# Patient Record
Sex: FEMALE | Race: WHITE
[De-identification: names, ages, dates, MRNs, and addresses within clinical notes are randomized per-mention and may not be internally consistent; named-entity substitution may affect disease eponyms.]

---

## 2017-10-19 ENCOUNTER — HOSPITAL ENCOUNTER (OUTPATIENT)
Dept: HOSPITAL 84 - D.CT | Age: 67
Discharge: HOME | End: 2017-10-19
Attending: FAMILY MEDICINE
Payer: MEDICARE
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
## 2017-10-19 DIAGNOSIS — S32.401A: Primary | ICD-10-CM

## 2017-12-31 ENCOUNTER — HOSPITAL ENCOUNTER (EMERGENCY)
Dept: HOSPITAL 84 - D.ER | Age: 67
Discharge: ANOTHER HOSPITAL | End: 2017-12-31
Payer: MEDICARE

## 2017-12-31 DIAGNOSIS — I10: ICD-10-CM

## 2017-12-31 DIAGNOSIS — R41.82: Primary | ICD-10-CM

## 2017-12-31 LAB
ALBUMIN SERPL-MCNC: 3.9 G/DL (ref 3.4–5)
ALP SERPL-CCNC: 88 U/L (ref 46–116)
ALT SERPL-CCNC: 35 U/L (ref 10–68)
AMPHETAMINES UR QL SCN: NEGATIVE QUAL
ANION GAP SERPL CALC-SCNC: 12.3 MMOL/L (ref 8–16)
APPEARANCE UR: CLEAR
BARBITURATES UR QL SCN: NEGATIVE QUAL
BASOPHILS NFR BLD AUTO: 0.1 % (ref 0–2)
BENZODIAZ UR QL SCN: NEGATIVE QUAL
BILIRUB SERPL-MCNC: 0.31 MG/DL (ref 0.2–1.3)
BILIRUB SERPL-MCNC: NEGATIVE MG/DL
BUN SERPL-MCNC: 18 MG/DL (ref 7–18)
BZE UR QL SCN: NEGATIVE QUAL
CALCIUM SERPL-MCNC: 8.7 MG/DL (ref 8.5–10.1)
CANNABINOIDS UR QL SCN: NEGATIVE QUAL
CHLORIDE SERPL-SCNC: 104 MMOL/L (ref 98–107)
CO2 SERPL-SCNC: 26.2 MMOL/L (ref 21–32)
COLOR UR: YELLOW
CREAT SERPL-MCNC: 0.8 MG/DL (ref 0.6–1.3)
EOSINOPHIL NFR BLD: 1 % (ref 0–7)
ERYTHROCYTE [DISTWIDTH] IN BLOOD BY AUTOMATED COUNT: 12.8 % (ref 11.5–14.5)
ETHANOL SERPL-MCNC: 0 MG/DL (ref 0–10)
GLOBULIN SER-MCNC: 3.4 G/L
GLUCOSE SERPL-MCNC: 137 MG/DL (ref 74–106)
GLUCOSE SERPL-MCNC: NEGATIVE MG/DL
HCT VFR BLD CALC: 41.9 % (ref 36–48)
HGB BLD-MCNC: 14.4 G/DL (ref 12–16)
IMM GRANULOCYTES NFR BLD: 0.1 % (ref 0–5)
KETONES UR STRIP-MCNC: NEGATIVE MG/DL
LYMPHOCYTES NFR BLD AUTO: 19.2 % (ref 15–50)
MCH RBC QN AUTO: 30.3 PG (ref 26–34)
MCHC RBC AUTO-ENTMCNC: 34.4 G/DL (ref 31–37)
MCV RBC: 88.2 FL (ref 80–100)
MONOCYTES NFR BLD: 7.6 % (ref 2–11)
NEUTROPHILS NFR BLD AUTO: 72 % (ref 40–80)
NITRITE UR-MCNC: NEGATIVE MG/ML
OPIATES UR QL SCN: NEGATIVE QUAL
OSMOLALITY SERPL CALC.SUM OF ELEC: 281 MOSM/KG (ref 275–300)
PCP UR QL SCN: NEGATIVE QUAL
PH UR STRIP: 7 [PH] (ref 5–6)
PLATELET # BLD: 215 10X3/UL (ref 130–400)
PMV BLD AUTO: 9.3 FL (ref 7.4–10.4)
POTASSIUM SERPL-SCNC: 3.5 MMOL/L (ref 3.5–5.1)
PROT SERPL-MCNC: 7.3 G/DL (ref 6.4–8.2)
PROT UR-MCNC: NEGATIVE MG/DL
RBC # BLD AUTO: 4.75 10X6/UL (ref 4–5.4)
SODIUM SERPL-SCNC: 139 MMOL/L (ref 136–145)
SP GR UR STRIP: 1.01 (ref 1–1.02)
UROBILINOGEN UR-MCNC: NORMAL MG/DL
WBC # BLD AUTO: 7 10X3/UL (ref 4.8–10.8)

## 2018-06-10 ENCOUNTER — HOSPITAL ENCOUNTER (INPATIENT)
Dept: HOSPITAL 84 - D.ER | Age: 68
LOS: 4 days | Discharge: HOME | DRG: 853 | End: 2018-06-14
Attending: INTERNAL MEDICINE | Admitting: INTERNAL MEDICINE
Payer: MEDICARE

## 2018-06-10 VITALS
WEIGHT: 174.37 LBS | HEIGHT: 67 IN | HEIGHT: 67 IN | BODY MASS INDEX: 27.37 KG/M2 | BODY MASS INDEX: 27.37 KG/M2 | WEIGHT: 174.37 LBS | BODY MASS INDEX: 27.37 KG/M2 | BODY MASS INDEX: 27.37 KG/M2

## 2018-06-10 VITALS — SYSTOLIC BLOOD PRESSURE: 140 MMHG | DIASTOLIC BLOOD PRESSURE: 76 MMHG

## 2018-06-10 VITALS — SYSTOLIC BLOOD PRESSURE: 158 MMHG | DIASTOLIC BLOOD PRESSURE: 97 MMHG

## 2018-06-10 VITALS — DIASTOLIC BLOOD PRESSURE: 92 MMHG | SYSTOLIC BLOOD PRESSURE: 140 MMHG

## 2018-06-10 VITALS — SYSTOLIC BLOOD PRESSURE: 180 MMHG | DIASTOLIC BLOOD PRESSURE: 99 MMHG

## 2018-06-10 VITALS — DIASTOLIC BLOOD PRESSURE: 97 MMHG | SYSTOLIC BLOOD PRESSURE: 186 MMHG

## 2018-06-10 VITALS — SYSTOLIC BLOOD PRESSURE: 152 MMHG | DIASTOLIC BLOOD PRESSURE: 94 MMHG

## 2018-06-10 DIAGNOSIS — G93.40: ICD-10-CM

## 2018-06-10 DIAGNOSIS — I10: ICD-10-CM

## 2018-06-10 DIAGNOSIS — K81.0: ICD-10-CM

## 2018-06-10 DIAGNOSIS — F05: ICD-10-CM

## 2018-06-10 DIAGNOSIS — N39.0: ICD-10-CM

## 2018-06-10 DIAGNOSIS — A41.9: Primary | ICD-10-CM

## 2018-06-10 LAB
ALBUMIN SERPL-MCNC: 3.6 G/DL (ref 3.4–5)
ALP SERPL-CCNC: 132 U/L (ref 46–116)
ALT SERPL-CCNC: 25 U/L (ref 10–68)
AMPHETAMINES UR QL SCN: NEGATIVE QUAL
AMYLASE SERPL-CCNC: 26 U/L (ref 25–115)
ANION GAP SERPL CALC-SCNC: 13.8 MMOL/L (ref 8–16)
APPEARANCE UR: (no result)
APTT BLD: 27.3 SECONDS (ref 22.8–39.4)
BACTERIA #/AREA URNS HPF: (no result) /HPF
BARBITURATES UR QL SCN: NEGATIVE QUAL
BASOPHILS NFR BLD AUTO: 0.1 % (ref 0–2)
BENZODIAZ UR QL SCN: NEGATIVE QUAL
BILIRUB DIRECT SERPL-MCNC: 0.13 MG/DL (ref 0–0.3)
BILIRUB SERPL-MCNC: 0.52 MG/DL (ref 0.2–1.3)
BILIRUB SERPL-MCNC: NEGATIVE MG/DL
BUN SERPL-MCNC: 12 MG/DL (ref 7–18)
BZE UR QL SCN: NEGATIVE QUAL
CALCIUM SERPL-MCNC: 8.9 MG/DL (ref 8.5–10.1)
CANNABINOIDS UR QL SCN: NEGATIVE QUAL
CHLORIDE SERPL-SCNC: 97 MMOL/L (ref 98–107)
CHOLEST/HDLC SERPL: 2.9 RATIO (ref 2.3–4.1)
CK MB SERPL-MCNC: 1.3 U/L (ref 0–3.6)
CK SERPL-CCNC: 91 UL (ref 21–215)
CO2 SERPL-SCNC: 28.9 MMOL/L (ref 21–32)
COLOR UR: (no result)
CREAT SERPL-MCNC: 0.9 MG/DL (ref 0.6–1.3)
EOSINOPHIL NFR BLD: 0 % (ref 0–7)
ERYTHROCYTE [DISTWIDTH] IN BLOOD BY AUTOMATED COUNT: 12.9 % (ref 11.5–14.5)
GLOBULIN SER-MCNC: 4.7 G/L
GLUCOSE SERPL-MCNC: 148 MG/DL (ref 74–106)
GLUCOSE SERPL-MCNC: NEGATIVE MG/DL
GRAN CASTS #/AREA URNS LPF: (no result) /LPF
HCT VFR BLD CALC: 42.3 % (ref 36–48)
HDLC SERPL-MCNC: 67 MG/DL (ref 32–96)
HGB BLD-MCNC: 14.6 G/DL (ref 12–16)
HYALINE CASTS #/AREA URNS LPF: (no result) /LPF
IMM GRANULOCYTES NFR BLD: 0.3 % (ref 0–5)
INR PPP: 1.02 (ref 0.85–1.17)
KETONES UR STRIP-MCNC: (no result) MG/DL
LDL-HDL RATIO: 1.8 RATIO (ref 1.5–3.5)
LDLC SERPL-MCNC: 122 MG/DL (ref 0–100)
LIPASE SERPL-CCNC: 90 U/L (ref 73–393)
LYMPHOCYTES NFR BLD AUTO: 6.4 % (ref 15–50)
MAGNESIUM SERPL-MCNC: 1.9 MG/DL (ref 1.8–2.4)
MCH RBC QN AUTO: 28.9 PG (ref 26–34)
MCHC RBC AUTO-ENTMCNC: 34.5 G/DL (ref 31–37)
MCV RBC: 83.6 FL (ref 80–100)
MONOCYTES NFR BLD: 8.6 % (ref 2–11)
MUCOUS THREADS #/AREA URNS LPF: (no result) /LPF
NEUTROPHILS NFR BLD AUTO: 84.6 % (ref 40–80)
NITRITE UR-MCNC: NEGATIVE MG/ML
OPIATES UR QL SCN: NEGATIVE QUAL
OSMOLALITY SERPL CALC.SUM OF ELEC: 274 MOSM/KG (ref 275–300)
PCP UR QL SCN: NEGATIVE QUAL
PH UR STRIP: 6 [PH] (ref 5–6)
PLATELET # BLD: 244 10X3/UL (ref 130–400)
PMV BLD AUTO: 9 FL (ref 7.4–10.4)
POTASSIUM SERPL-SCNC: 3.7 MMOL/L (ref 3.5–5.1)
PROT SERPL-MCNC: 8.3 G/DL (ref 6.4–8.2)
PROT UR-MCNC: (no result) MG/DL
PROTHROMBIN TIME: 13 SECONDS (ref 11.6–15)
RBC # BLD AUTO: 5.06 10X6/UL (ref 4–5.4)
RBC #/AREA URNS HPF: (no result) /HPF (ref 0–5)
SODIUM SERPL-SCNC: 136 MMOL/L (ref 136–145)
SP GR UR STRIP: 1.02 (ref 1–1.02)
SQUAMOUS #/AREA URNS HPF: (no result) /HPF (ref 0–5)
TRIGL SERPL-MCNC: 40 MG/DL (ref 30–200)
TROPONIN I SERPL-MCNC: < 0.017 NG/ML (ref 0–0.06)
TSH SERPL-ACNC: 0.97 UIU/ML (ref 0.36–3.74)
UROBILINOGEN UR-MCNC: NORMAL MG/DL
WBC # BLD AUTO: 13.5 10X3/UL (ref 4.8–10.8)
WBC #/AREA URNS HPF: (no result) /HPF (ref 0–5)

## 2018-06-11 VITALS — SYSTOLIC BLOOD PRESSURE: 89 MMHG | DIASTOLIC BLOOD PRESSURE: 58 MMHG

## 2018-06-11 VITALS — DIASTOLIC BLOOD PRESSURE: 73 MMHG | SYSTOLIC BLOOD PRESSURE: 127 MMHG

## 2018-06-11 VITALS — DIASTOLIC BLOOD PRESSURE: 56 MMHG | SYSTOLIC BLOOD PRESSURE: 85 MMHG

## 2018-06-11 LAB
ALBUMIN SERPL-MCNC: 2.8 G/DL (ref 3.4–5)
ALP SERPL-CCNC: 95 U/L (ref 46–116)
ALT SERPL-CCNC: 25 U/L (ref 10–68)
ANION GAP SERPL CALC-SCNC: 13.2 MMOL/L (ref 8–16)
BASOPHILS NFR BLD AUTO: 0.1 % (ref 0–2)
BILIRUB SERPL-MCNC: 0.76 MG/DL (ref 0.2–1.3)
BUN SERPL-MCNC: 15 MG/DL (ref 7–18)
CALCIUM SERPL-MCNC: 8.7 MG/DL (ref 8.5–10.1)
CHLORIDE SERPL-SCNC: 101 MMOL/L (ref 98–107)
CO2 SERPL-SCNC: 26.5 MMOL/L (ref 21–32)
CREAT SERPL-MCNC: 1 MG/DL (ref 0.6–1.3)
EOSINOPHIL NFR BLD: 0 % (ref 0–7)
ERYTHROCYTE [DISTWIDTH] IN BLOOD BY AUTOMATED COUNT: 13.2 % (ref 11.5–14.5)
GLOBULIN SER-MCNC: 4.3 G/L
GLUCOSE SERPL-MCNC: 140 MG/DL (ref 74–106)
HCT VFR BLD CALC: 40.1 % (ref 36–48)
HGB BLD-MCNC: 13.8 G/DL (ref 12–16)
IMM GRANULOCYTES NFR BLD: 0.3 % (ref 0–5)
LYMPHOCYTES NFR BLD AUTO: 6.1 % (ref 15–50)
MCH RBC QN AUTO: 28.9 PG (ref 26–34)
MCHC RBC AUTO-ENTMCNC: 34.4 G/DL (ref 31–37)
MCV RBC: 83.9 FL (ref 80–100)
MONOCYTES NFR BLD: 11.1 % (ref 2–11)
NEUTROPHILS NFR BLD AUTO: 82.4 % (ref 40–80)
OSMOLALITY SERPL CALC.SUM OF ELEC: 276 MOSM/KG (ref 275–300)
PLATELET # BLD: 262 10X3/UL (ref 130–400)
PMV BLD AUTO: 9.5 FL (ref 7.4–10.4)
POTASSIUM SERPL-SCNC: 3.7 MMOL/L (ref 3.5–5.1)
PROT SERPL-MCNC: 7.1 G/DL (ref 6.4–8.2)
RBC # BLD AUTO: 4.78 10X6/UL (ref 4–5.4)
SODIUM SERPL-SCNC: 137 MMOL/L (ref 136–145)
WBC # BLD AUTO: 16.1 10X3/UL (ref 4.8–10.8)

## 2018-06-12 VITALS — DIASTOLIC BLOOD PRESSURE: 54 MMHG | SYSTOLIC BLOOD PRESSURE: 84 MMHG

## 2018-06-12 VITALS — SYSTOLIC BLOOD PRESSURE: 119 MMHG | DIASTOLIC BLOOD PRESSURE: 74 MMHG

## 2018-06-12 VITALS — DIASTOLIC BLOOD PRESSURE: 63 MMHG | SYSTOLIC BLOOD PRESSURE: 165 MMHG

## 2018-06-12 VITALS — DIASTOLIC BLOOD PRESSURE: 75 MMHG | SYSTOLIC BLOOD PRESSURE: 169 MMHG

## 2018-06-12 VITALS — SYSTOLIC BLOOD PRESSURE: 101 MMHG | DIASTOLIC BLOOD PRESSURE: 59 MMHG

## 2018-06-12 VITALS — SYSTOLIC BLOOD PRESSURE: 143 MMHG | DIASTOLIC BLOOD PRESSURE: 89 MMHG

## 2018-06-12 LAB
ALBUMIN SERPL-MCNC: 2.2 G/DL (ref 3.4–5)
ALP SERPL-CCNC: 84 U/L (ref 46–116)
ALT SERPL-CCNC: 24 U/L (ref 10–68)
ANION GAP SERPL CALC-SCNC: 15.4 MMOL/L (ref 8–16)
BASOPHILS NFR BLD AUTO: 0.1 % (ref 0–2)
BILIRUB SERPL-MCNC: 0.7 MG/DL (ref 0.2–1.3)
BUN SERPL-MCNC: 21 MG/DL (ref 7–18)
CALCIUM SERPL-MCNC: 8 MG/DL (ref 8.5–10.1)
CHLORIDE SERPL-SCNC: 103 MMOL/L (ref 98–107)
CO2 SERPL-SCNC: 24.2 MMOL/L (ref 21–32)
CREAT SERPL-MCNC: 1.2 MG/DL (ref 0.6–1.3)
EOSINOPHIL NFR BLD: 0 % (ref 0–7)
ERYTHROCYTE [DISTWIDTH] IN BLOOD BY AUTOMATED COUNT: 13.4 % (ref 11.5–14.5)
GLOBULIN SER-MCNC: 3.8 G/L
GLUCOSE SERPL-MCNC: 101 MG/DL (ref 74–106)
HCT VFR BLD CALC: 35.6 % (ref 36–48)
HGB BLD-MCNC: 12 G/DL (ref 12–16)
IMM GRANULOCYTES NFR BLD: 0.5 % (ref 0–5)
LYMPHOCYTES NFR BLD AUTO: 9 % (ref 15–50)
MCH RBC QN AUTO: 28.6 PG (ref 26–34)
MCHC RBC AUTO-ENTMCNC: 33.7 G/DL (ref 31–37)
MCV RBC: 84.8 FL (ref 80–100)
MONOCYTES NFR BLD: 9.8 % (ref 2–11)
NEUTROPHILS NFR BLD AUTO: 80.6 % (ref 40–80)
OSMOLALITY SERPL CALC.SUM OF ELEC: 280 MOSM/KG (ref 275–300)
PLATELET # BLD: 220 10X3/UL (ref 130–400)
PMV BLD AUTO: 9.5 FL (ref 7.4–10.4)
POTASSIUM SERPL-SCNC: 3.6 MMOL/L (ref 3.5–5.1)
PROT SERPL-MCNC: 6 G/DL (ref 6.4–8.2)
RBC # BLD AUTO: 4.2 10X6/UL (ref 4–5.4)
SODIUM SERPL-SCNC: 139 MMOL/L (ref 136–145)
WBC # BLD AUTO: 15.5 10X3/UL (ref 4.8–10.8)

## 2018-06-12 PROCEDURE — 0FT44ZZ RESECTION OF GALLBLADDER, PERCUTANEOUS ENDOSCOPIC APPROACH: ICD-10-PCS | Performed by: SURGERY

## 2018-06-13 VITALS — SYSTOLIC BLOOD PRESSURE: 132 MMHG | DIASTOLIC BLOOD PRESSURE: 91 MMHG

## 2018-06-13 VITALS — SYSTOLIC BLOOD PRESSURE: 124 MMHG | DIASTOLIC BLOOD PRESSURE: 81 MMHG

## 2018-06-13 VITALS — DIASTOLIC BLOOD PRESSURE: 89 MMHG | SYSTOLIC BLOOD PRESSURE: 132 MMHG

## 2018-06-13 VITALS — SYSTOLIC BLOOD PRESSURE: 150 MMHG | DIASTOLIC BLOOD PRESSURE: 86 MMHG

## 2018-06-13 VITALS — DIASTOLIC BLOOD PRESSURE: 89 MMHG | SYSTOLIC BLOOD PRESSURE: 143 MMHG

## 2018-06-13 VITALS — SYSTOLIC BLOOD PRESSURE: 137 MMHG | DIASTOLIC BLOOD PRESSURE: 93 MMHG

## 2018-06-13 LAB
ALBUMIN SERPL-MCNC: 2 G/DL (ref 3.4–5)
ALP SERPL-CCNC: 128 U/L (ref 46–116)
ALT SERPL-CCNC: 118 U/L (ref 10–68)
ANION GAP SERPL CALC-SCNC: 15.9 MMOL/L (ref 8–16)
BASOPHILS NFR BLD AUTO: 0 % (ref 0–2)
BILIRUB SERPL-MCNC: 0.5 MG/DL (ref 0.2–1.3)
BUN SERPL-MCNC: 17 MG/DL (ref 7–18)
CALCIUM SERPL-MCNC: 8.5 MG/DL (ref 8.5–10.1)
CHLORIDE SERPL-SCNC: 103 MMOL/L (ref 98–107)
CO2 SERPL-SCNC: 24.1 MMOL/L (ref 21–32)
CREAT SERPL-MCNC: 0.9 MG/DL (ref 0.6–1.3)
EOSINOPHIL NFR BLD: 0 % (ref 0–7)
ERYTHROCYTE [DISTWIDTH] IN BLOOD BY AUTOMATED COUNT: 13.5 % (ref 11.5–14.5)
GLOBULIN SER-MCNC: 4.2 G/L
GLUCOSE SERPL-MCNC: 184 MG/DL (ref 74–106)
HCT VFR BLD CALC: 37.3 % (ref 36–48)
HGB BLD-MCNC: 12.6 G/DL (ref 12–16)
IMM GRANULOCYTES NFR BLD: 0.4 % (ref 0–5)
LYMPHOCYTES NFR BLD AUTO: 3.8 % (ref 15–50)
MCH RBC QN AUTO: 28.6 PG (ref 26–34)
MCHC RBC AUTO-ENTMCNC: 33.8 G/DL (ref 31–37)
MCV RBC: 84.8 FL (ref 80–100)
MONOCYTES NFR BLD: 5.9 % (ref 2–11)
NEUTROPHILS NFR BLD AUTO: 89.9 % (ref 40–80)
OSMOLALITY SERPL CALC.SUM OF ELEC: 284 MOSM/KG (ref 275–300)
PLATELET # BLD: 256 10X3/UL (ref 130–400)
PMV BLD AUTO: 9.4 FL (ref 7.4–10.4)
POTASSIUM SERPL-SCNC: 4 MMOL/L (ref 3.5–5.1)
PROT SERPL-MCNC: 6.2 G/DL (ref 6.4–8.2)
RBC # BLD AUTO: 4.4 10X6/UL (ref 4–5.4)
SODIUM SERPL-SCNC: 139 MMOL/L (ref 136–145)
WBC # BLD AUTO: 12.9 10X3/UL (ref 4.8–10.8)

## 2018-06-14 VITALS — SYSTOLIC BLOOD PRESSURE: 146 MMHG | DIASTOLIC BLOOD PRESSURE: 85 MMHG

## 2018-06-14 VITALS — DIASTOLIC BLOOD PRESSURE: 88 MMHG | SYSTOLIC BLOOD PRESSURE: 132 MMHG

## 2018-06-14 VITALS — DIASTOLIC BLOOD PRESSURE: 90 MMHG | SYSTOLIC BLOOD PRESSURE: 150 MMHG

## 2018-06-14 VITALS — DIASTOLIC BLOOD PRESSURE: 84 MMHG | SYSTOLIC BLOOD PRESSURE: 127 MMHG

## 2018-06-14 LAB
ALBUMIN SERPL-MCNC: 1.8 G/DL (ref 3.4–5)
ALP SERPL-CCNC: 99 U/L (ref 46–116)
ALT SERPL-CCNC: 70 U/L (ref 10–68)
ANION GAP SERPL CALC-SCNC: 8.7 MMOL/L (ref 8–16)
BASOPHILS NFR BLD AUTO: 0 % (ref 0–2)
BILIRUB SERPL-MCNC: 0.34 MG/DL (ref 0.2–1.3)
BUN SERPL-MCNC: 14 MG/DL (ref 7–18)
CALCIUM SERPL-MCNC: 8.3 MG/DL (ref 8.5–10.1)
CHLORIDE SERPL-SCNC: 106 MMOL/L (ref 98–107)
CO2 SERPL-SCNC: 29.1 MMOL/L (ref 21–32)
CREAT SERPL-MCNC: 0.7 MG/DL (ref 0.6–1.3)
EOSINOPHIL NFR BLD: 0 % (ref 0–7)
ERYTHROCYTE [DISTWIDTH] IN BLOOD BY AUTOMATED COUNT: 13.7 % (ref 11.5–14.5)
GLOBULIN SER-MCNC: 3.9 G/L
GLUCOSE SERPL-MCNC: 125 MG/DL (ref 74–106)
HCT VFR BLD CALC: 31.5 % (ref 36–48)
HGB BLD-MCNC: 10.4 G/DL (ref 12–16)
IMM GRANULOCYTES NFR BLD: 0.4 % (ref 0–5)
LYMPHOCYTES NFR BLD AUTO: 9.9 % (ref 15–50)
MCH RBC QN AUTO: 28 PG (ref 26–34)
MCHC RBC AUTO-ENTMCNC: 33 G/DL (ref 31–37)
MCV RBC: 84.7 FL (ref 80–100)
MONOCYTES NFR BLD: 9.6 % (ref 2–11)
NEUTROPHILS NFR BLD AUTO: 80.1 % (ref 40–80)
OSMOLALITY SERPL CALC.SUM OF ELEC: 280 MOSM/KG (ref 275–300)
PLATELET # BLD: 249 10X3/UL (ref 130–400)
PMV BLD AUTO: 9.1 FL (ref 7.4–10.4)
POTASSIUM SERPL-SCNC: 3.8 MMOL/L (ref 3.5–5.1)
PROT SERPL-MCNC: 5.7 G/DL (ref 6.4–8.2)
RBC # BLD AUTO: 3.72 10X6/UL (ref 4–5.4)
SODIUM SERPL-SCNC: 140 MMOL/L (ref 136–145)
WBC # BLD AUTO: 10.9 10X3/UL (ref 4.8–10.8)

## 2018-07-02 ENCOUNTER — HOSPITAL ENCOUNTER (OUTPATIENT)
Dept: HOSPITAL 84 - D.MAMMO | Age: 68
Discharge: HOME | End: 2018-07-02
Attending: SURGERY
Payer: MEDICARE

## 2018-07-02 VITALS — BODY MASS INDEX: 26.2 KG/M2

## 2018-07-02 DIAGNOSIS — K80.10: Primary | ICD-10-CM

## 2018-07-06 LAB
APTT BLD: 25.3 SECONDS (ref 22.8–39.4)
BASOPHILS NFR BLD AUTO: 0.5 % (ref 0–2)
EOSINOPHIL NFR BLD: 1.6 % (ref 0–7)
ERYTHROCYTE [DISTWIDTH] IN BLOOD BY AUTOMATED COUNT: 14.2 % (ref 11.5–14.5)
HCT VFR BLD CALC: 40.3 % (ref 36–48)
HGB BLD-MCNC: 13 G/DL (ref 12–16)
IMM GRANULOCYTES NFR BLD: 0.5 % (ref 0–5)
INR PPP: 0.98 (ref 0.85–1.17)
LYMPHOCYTES NFR BLD AUTO: 35.6 % (ref 15–50)
MCH RBC QN AUTO: 28.6 PG (ref 26–34)
MCHC RBC AUTO-ENTMCNC: 32.3 G/DL (ref 31–37)
MCV RBC: 88.8 FL (ref 80–100)
MONOCYTES NFR BLD: 11.1 % (ref 2–11)
NEUTROPHILS NFR BLD AUTO: 50.7 % (ref 40–80)
PLATELET # BLD: 190 10X3/UL (ref 130–400)
PMV BLD AUTO: 9.1 FL (ref 7.4–10.4)
PROTHROMBIN TIME: 12.6 SECONDS (ref 11.6–15)
RBC # BLD AUTO: 4.54 10X6/UL (ref 4–5.4)
WBC # BLD AUTO: 3.8 10X3/UL (ref 4.8–10.8)

## 2018-07-09 ENCOUNTER — HOSPITAL ENCOUNTER (OUTPATIENT)
Dept: HOSPITAL 84 - D.OPS | Age: 68
Discharge: HOME | End: 2018-07-09
Attending: SURGERY
Payer: MEDICARE

## 2018-07-09 VITALS
HEIGHT: 67 IN | WEIGHT: 165 LBS | HEIGHT: 67 IN | WEIGHT: 165 LBS | BODY MASS INDEX: 25.9 KG/M2 | BODY MASS INDEX: 25.9 KG/M2

## 2018-07-09 VITALS — SYSTOLIC BLOOD PRESSURE: 145 MMHG | DIASTOLIC BLOOD PRESSURE: 74 MMHG

## 2018-07-09 DIAGNOSIS — C22.1: Primary | ICD-10-CM

## 2018-07-09 DIAGNOSIS — Z01.812: ICD-10-CM

## 2018-07-19 ENCOUNTER — HOSPITAL ENCOUNTER (OUTPATIENT)
Dept: HOSPITAL 84 - D.CT | Age: 68
Discharge: HOME | End: 2018-07-19
Attending: SURGERY
Payer: MEDICARE

## 2018-07-19 VITALS — BODY MASS INDEX: 25.9 KG/M2

## 2018-07-19 DIAGNOSIS — K80.10: Primary | ICD-10-CM

## 2018-07-25 ENCOUNTER — HOSPITAL ENCOUNTER (OUTPATIENT)
Dept: HOSPITAL 84 - D.US | Age: 68
Discharge: HOME | End: 2018-07-25
Attending: SURGERY
Payer: MEDICARE

## 2018-07-25 VITALS — BODY MASS INDEX: 25.9 KG/M2

## 2018-07-25 DIAGNOSIS — N63.11: Primary | ICD-10-CM

## 2018-08-21 LAB
APTT BLD: 30.9 SECONDS (ref 22.8–39.4)
BASOPHILS NFR BLD AUTO: 0.2 % (ref 0–2)
EOSINOPHIL NFR BLD: 1.4 % (ref 0–7)
ERYTHROCYTE [DISTWIDTH] IN BLOOD BY AUTOMATED COUNT: 13.9 % (ref 11.5–14.5)
HCT VFR BLD CALC: 42.2 % (ref 36–48)
HGB BLD-MCNC: 14.1 G/DL (ref 12–16)
IMM GRANULOCYTES NFR BLD: 0.2 % (ref 0–5)
INR PPP: 1.1 (ref 0.85–1.17)
LYMPHOCYTES NFR BLD AUTO: 31.4 % (ref 15–50)
MCH RBC QN AUTO: 28.9 PG (ref 26–34)
MCHC RBC AUTO-ENTMCNC: 33.4 G/DL (ref 31–37)
MCV RBC: 86.5 FL (ref 80–100)
MONOCYTES NFR BLD: 9.1 % (ref 2–11)
NEUTROPHILS NFR BLD AUTO: 57.7 % (ref 40–80)
PLATELET # BLD: 173 10X3/UL (ref 130–400)
PMV BLD AUTO: 9.2 FL (ref 7.4–10.4)
PROTHROMBIN TIME: 13.6 SECONDS (ref 11.6–15)
RBC # BLD AUTO: 4.88 10X6/UL (ref 4–5.4)
WBC # BLD AUTO: 4.4 10X3/UL (ref 4.8–10.8)

## 2018-08-22 ENCOUNTER — HOSPITAL ENCOUNTER (OUTPATIENT)
Dept: HOSPITAL 84 - D.OPS | Age: 68
Discharge: HOME | End: 2018-08-22
Attending: SURGERY
Payer: MEDICARE

## 2018-08-22 VITALS — HEIGHT: 66 IN | BODY MASS INDEX: 27 KG/M2 | WEIGHT: 168 LBS

## 2018-08-22 VITALS — SYSTOLIC BLOOD PRESSURE: 126 MMHG | DIASTOLIC BLOOD PRESSURE: 76 MMHG

## 2018-08-22 DIAGNOSIS — Z01.812: ICD-10-CM

## 2018-08-22 DIAGNOSIS — C79.9: Primary | ICD-10-CM

## 2018-08-22 DIAGNOSIS — N63.11: ICD-10-CM

## 2018-09-18 ENCOUNTER — HOSPITAL ENCOUNTER (EMERGENCY)
Dept: HOSPITAL 84 - D.ER | Age: 68
Discharge: HOME | End: 2018-09-18
Payer: MEDICARE

## 2018-09-18 VITALS — HEIGHT: 66 IN | WEIGHT: 165.35 LBS | BODY MASS INDEX: 26.57 KG/M2

## 2018-09-18 VITALS — DIASTOLIC BLOOD PRESSURE: 73 MMHG | SYSTOLIC BLOOD PRESSURE: 128 MMHG

## 2018-09-18 DIAGNOSIS — I10: ICD-10-CM

## 2018-09-18 DIAGNOSIS — L76.32: Primary | ICD-10-CM

## 2018-09-18 DIAGNOSIS — Z98.890: ICD-10-CM

## 2018-09-18 DIAGNOSIS — Z86.73: ICD-10-CM

## 2018-09-18 LAB
ALBUMIN SERPL-MCNC: 3.1 G/DL (ref 3.4–5)
ALP SERPL-CCNC: 86 U/L (ref 46–116)
ALT SERPL-CCNC: 22 U/L (ref 10–68)
ANION GAP SERPL CALC-SCNC: 13.4 MMOL/L (ref 8–16)
BASOPHILS NFR BLD AUTO: 0.2 % (ref 0–2)
BILIRUB SERPL-MCNC: 0.26 MG/DL (ref 0.2–1.3)
BUN SERPL-MCNC: 17 MG/DL (ref 7–18)
CALCIUM SERPL-MCNC: 8.7 MG/DL (ref 8.5–10.1)
CHLORIDE SERPL-SCNC: 105 MMOL/L (ref 98–107)
CO2 SERPL-SCNC: 24.9 MMOL/L (ref 21–32)
CREAT SERPL-MCNC: 0.8 MG/DL (ref 0.6–1.3)
CRP SERPL-MCNC: 4.8 MG/DL (ref 0–0.9)
EOSINOPHIL NFR BLD: 1.6 % (ref 0–7)
ERYTHROCYTE [DISTWIDTH] IN BLOOD BY AUTOMATED COUNT: 13.6 % (ref 11.5–14.5)
GLOBULIN SER-MCNC: 3.5 G/L
GLUCOSE SERPL-MCNC: 106 MG/DL (ref 74–106)
HCT VFR BLD CALC: 36.1 % (ref 36–48)
HGB BLD-MCNC: 11.9 G/DL (ref 12–16)
IMM GRANULOCYTES NFR BLD: 0.2 % (ref 0–5)
LYMPHOCYTES NFR BLD AUTO: 27.5 % (ref 15–50)
MCH RBC QN AUTO: 29.2 PG (ref 26–34)
MCHC RBC AUTO-ENTMCNC: 33 G/DL (ref 31–37)
MCV RBC: 88.5 FL (ref 80–100)
MONOCYTES NFR BLD: 15 % (ref 2–11)
NEUTROPHILS NFR BLD AUTO: 55.5 % (ref 40–80)
OSMOLALITY SERPL CALC.SUM OF ELEC: 279 MOSM/KG (ref 275–300)
PLATELET # BLD: 213 10X3/UL (ref 130–400)
PMV BLD AUTO: 8.9 FL (ref 7.4–10.4)
POTASSIUM SERPL-SCNC: 4.3 MMOL/L (ref 3.5–5.1)
PROT SERPL-MCNC: 6.6 G/DL (ref 6.4–8.2)
RBC # BLD AUTO: 4.08 10X6/UL (ref 4–5.4)
SODIUM SERPL-SCNC: 139 MMOL/L (ref 136–145)
WBC # BLD AUTO: 5.5 10X3/UL (ref 4.8–10.8)

## 2018-12-27 ENCOUNTER — HOSPITAL ENCOUNTER (OUTPATIENT)
Dept: HOSPITAL 84 - D.SP | Age: 68
Discharge: HOME | End: 2018-12-27
Attending: INTERNAL MEDICINE
Payer: MEDICARE

## 2018-12-27 VITALS — BODY MASS INDEX: 26.7 KG/M2

## 2018-12-27 DIAGNOSIS — Z01.812: ICD-10-CM

## 2018-12-27 DIAGNOSIS — T85.618A: Primary | ICD-10-CM

## 2019-01-14 LAB
APTT BLD: 28.7 SECONDS (ref 22.8–39.4)
BASOPHILS NFR BLD AUTO: 0.4 % (ref 0–2)
EOSINOPHIL NFR BLD: 2 % (ref 0–7)
ERYTHROCYTE [DISTWIDTH] IN BLOOD BY AUTOMATED COUNT: 16 % (ref 11.5–14.5)
HCT VFR BLD CALC: 34.8 % (ref 36–48)
HGB BLD-MCNC: 11.6 G/DL (ref 12–16)
IMM GRANULOCYTES NFR BLD: 0.2 % (ref 0–5)
INR PPP: 0.86 (ref 0.85–1.17)
LYMPHOCYTES NFR BLD AUTO: 29.5 % (ref 15–50)
MCH RBC QN AUTO: 30.9 PG (ref 26–34)
MCHC RBC AUTO-ENTMCNC: 33.3 G/DL (ref 31–37)
MCV RBC: 92.8 FL (ref 80–100)
MONOCYTES NFR BLD: 11.2 % (ref 2–11)
NEUTROPHILS NFR BLD AUTO: 56.7 % (ref 40–80)
PLATELET # BLD: 251 10X3/UL (ref 130–400)
PMV BLD AUTO: 8.8 FL (ref 7.4–10.4)
PROTHROMBIN TIME: 11.2 SECONDS (ref 11.6–15)
RBC # BLD AUTO: 3.75 10X6/UL (ref 4–5.4)
WBC # BLD AUTO: 5.1 10X3/UL (ref 4.8–10.8)

## 2019-01-17 ENCOUNTER — HOSPITAL ENCOUNTER (OUTPATIENT)
Dept: HOSPITAL 84 - D.OPS | Age: 69
Discharge: HOME | End: 2019-01-17
Attending: PODIATRIST
Payer: COMMERCIAL

## 2019-01-17 VITALS
HEIGHT: 66 IN | WEIGHT: 157 LBS | BODY MASS INDEX: 25.23 KG/M2 | BODY MASS INDEX: 25.23 KG/M2 | HEIGHT: 66 IN | WEIGHT: 157 LBS

## 2019-01-17 VITALS — SYSTOLIC BLOOD PRESSURE: 162 MMHG | DIASTOLIC BLOOD PRESSURE: 72 MMHG

## 2019-01-17 DIAGNOSIS — L60.0: ICD-10-CM

## 2019-01-17 DIAGNOSIS — M20.12: Primary | ICD-10-CM

## 2019-01-17 DIAGNOSIS — Z01.812: ICD-10-CM

## 2019-01-17 NOTE — OP
PATIENT NAME:  CONRAD DENNIS                    MEDICAL RECORD: [id number]
:46                                             LOCATION:D.OPS          
                                                         ADMISSION DATE:        
SURGEON:  SANDRA THOMPSON             
 
 
DATE OF OPERATION:  2019
 
SURGEON:  Sandra Thompson DPM
 
PREOPERATIVE DIAGNOSES:
1.  Hallux abductovalgus, left foot.
2.  Ingrown toenail, left great toe.
3.  Ingrown toenail, right great toe.
 
POSTOPERATIVE DIAGNOSES:
1.  Hallux abductovalgus, left foot.
2.  Ingrown toenail, left great toe.
3.  Ingrown toenail, right great toe.
 
PROCEDURES:
1.  Dave-Gabino bunionectomy, left foot.
2.  Total permanent chemical matrixectomy, left great toe.
3.  Total permanent chemical matrixectomy, right great toe.
 
ANESTHESIA:  General.
 
HEMOSTASIS:  Pneumatic ankle tourniquet inflated to 250 mmHg.
 
ESTIMATED BLOOD LOSS:  Minimal.
 
MATERIALS:  One 2.5-mm cannulated screw and one Quick Staple, both [employer].
 
INJECTABLES:  20 cc of 0.5% bupivacaine plain.
 
The patient has long-standing history of pain associated with left foot bunion
deformity and ingrown toenails on both great toes.  We have discussed the
proposed procedures.  Risks and benefits were discussed.  Complications were
reviewed.  All questions were answered.  She was appropriately consented for the
above-mentioned procedures.
 
DESCRIPTION OF PROCEDURE:  The patient was brought into the operating room and
placed on the operating table in supine position.  A time-out was called by Dr. Thompson, who identified the patient, the surgical site, and the surgery to be
performed.  Once appropriate anesthesia was obtained, the feet was prepped and
draped in the usual aseptic manner.
 
The pneumatic ankle tourniquet was inflated to 250 mmHg on the well-padded left
ankle.
 
Attention was directed to the dorsal aspect of the left first
metatarsophalangeal joint, where a 6-cm linear incision was made just medial to
the extensor hallucis longus tendon.  This incision was carried deep to soft
tissue with care being taken to retract all vital neurovascular structures.  All
bleeders were cauterized along the way.  The first intermetatarsal space was
entered utilizing both sharp and blunt dissection.  The conjoined tendon of the
adductor hallucis muscle was noted at the base of the proximal phalanx and was
 
 
 
OPERATIVE REPORT                               [id number]    CONRAD DENNIS  
 
 
sharply transected at this level.  Attention was further directed proximally and
the fibular sesamoidal ligament was identified and sharply transected. 
Attention was directed to the dorsal aspect of the first metatarsophalangeal
joint, where a linear incision was made and all periosteum was reflected from
the head of the first metatarsal as well as the base of the proximal phalanx. 
The head of the first metatarsal was noted to be hypertrophied medially. 
Utilizing a sagittal saw, the hypertrophied medial eminence was removed.
 
Next, utilizing a sagittal saw, a V-shaped osteotomy was created in the head of
the first metatarsal.  This was a through-and-through osteotomy.  The capital
fragment was shifted laterally and impacted upon the first metatarsal shaft. 
Utilizing 's recommended technique, one 2.5-mm screw was placed
across the osteotomy.  All remaining overhanging bone from the medial aspect of
the first metatarsal was removed with a sagittal saw.  Attention was then
directed to the base of the proximal phalanx, where a V-shaped osteotomy was
created with the apex oriented laterally.  The wedge-shaped bone was removed and
the osteotomy was reduced.  The osteotomy was then fixated utilizing one [employer] Quick Staple.  The surgical site was then irrigated with copious amounts
of normal sterile saline via bulb syringe.  Any remaining sharp bone edges were
removed with rasp and rongeur.  The periosteum was approximated and coapted
utilizing 3-0 Vicryl.  The subcutaneous was then reapproximated and coapted
using 4-0 Vicryl.  The skin was then reapproximated and coapted using 4-0 nylon.
 A dressing consisting of Xeroform, 4 x 4's, Kerlix, and Coban was applied to
the left foot.
 
TOTAL PERMANENT CHEMICAL MATRIXECTOMY, LEFT GREAT TOE:  Using sterile
instrumentation, the entire nail plate was loosened from the underlying nail bed
with the spatula and removed in toto with hemostat.  The nail grooves were then
curetted free of all debris with a curette.
 
Next, 10% NaOH was applied to the exposed matrix for 10 seconds.  The nail
groove was then flushed with copious amounts of normal sterile saline.  A
dressing consisting of antibiotic ointment, 4 x 4's, Kerlix, and Coban was
applied to the left great toe.
 
TOTAL PERMANENT CHEMICAL MATRIXECTOMY, RIGHT GREAT TOE:  The exact same
procedure that was performed on the left great toe was performed on the right
great toe without exception.
 
The pneumatic ankle tourniquet was released from the left ankle and cap refill
time was noted to be immediate to the left foot.
 
The patient tolerated the procedure and the anesthesia well.  She left the
operating room with vital signs stable and capillary refill time intact.
 
The patient was discharged home with instructions to ice and elevate her feet. 
She was dispensed a boot to help offload the left foot.  She was provided
prescription for pain medications with Norco 7.5/325, #30; ibuprofen 800 mg,
#30; and Phenergan 25 mg, #20.  She was also provided with my cell phone number
for any after hour difficulties and there were no complications with this
procedure.
 
TRANSINT:[id number] Voice Confirmation ID: [id number] DOCUMENT ID: [id number]
                                           
 
 
 
OPERATIVE REPORT                               [id number]    CONRAD DENNIS DAVID J             
 
 
 
 
 
 
 
 
 
 
 
 
 
 
 
 
 
 
 
 
 
 
 
 
 
 
 
 
 
 
 
 
 
 
 
 
 
 
 
 
 
 
 
 
 
 
CC:                                                             [id number]
DICTATION DATE: 19 1424     :     [address]      [facility] 
                                                                      [id number] [place] [address]

## 2019-01-17 NOTE — NUR
1515  IV DC'D.  CATHETER INTACT.  NO BLEEDING AT SITE AFTER HOLDING
PRESSURE FOR 2 MINUTES.  BANDAID APPLIED.
 
1530  PT GIVEN DISCHARGE INSTRUCTIONS.  QUESTIONS ANSWERED. PT READY
TO GO HOME.
 
1542  PT TRANSPORTED TO CAR FOR DISCHARGE HOME VIA WC.

## 2020-01-20 ENCOUNTER — HOSPITAL ENCOUNTER (OUTPATIENT)
Dept: HOSPITAL 84 - D.MAMMO | Age: 70
Discharge: HOME | End: 2020-01-20
Attending: INTERNAL MEDICINE
Payer: COMMERCIAL

## 2020-01-20 VITALS — BODY MASS INDEX: 25.4 KG/M2

## 2020-01-20 DIAGNOSIS — D05.11: Primary | ICD-10-CM

## 2020-02-21 ENCOUNTER — HOSPITAL ENCOUNTER (EMERGENCY)
Dept: HOSPITAL 84 - D.ER | Age: 70
Discharge: HOME | End: 2020-02-21
Payer: COMMERCIAL

## 2020-02-21 VITALS
BODY MASS INDEX: 25.77 KG/M2 | BODY MASS INDEX: 25.77 KG/M2 | HEIGHT: 66 IN | HEIGHT: 66 IN | WEIGHT: 160.34 LBS | WEIGHT: 160.34 LBS

## 2020-02-21 VITALS — DIASTOLIC BLOOD PRESSURE: 81 MMHG | SYSTOLIC BLOOD PRESSURE: 146 MMHG
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
## 2020-02-21 DIAGNOSIS — I10: ICD-10-CM

## 2020-02-21 DIAGNOSIS — S60.445A: Primary | ICD-10-CM

## 2020-02-21 DIAGNOSIS — M79.89: ICD-10-CM

## 2021-04-22 ENCOUNTER — HOSPITAL ENCOUNTER (OUTPATIENT)
Dept: HOSPITAL 84 - D.MAMMO | Age: 71
Discharge: HOME | End: 2021-04-22
Attending: INTERNAL MEDICINE
Payer: COMMERCIAL

## 2021-04-22 VITALS — BODY MASS INDEX: 25.8 KG/M2

## 2021-04-22 DIAGNOSIS — D05.11: Primary | ICD-10-CM
